# Patient Record
(demographics unavailable — no encounter records)

---

## 2024-11-07 NOTE — ADDENDUM
[FreeTextEntry1] : Luan Whelan assisted in documentation on Nov 7 2024 acting as a scribe for Dr. Rajan Toure.

## 2024-11-07 NOTE — DISCUSSION/SUMMARY
[FreeTextEntry1] : Patient is a 40-year-old white female with no cardiac risk factors postpartum 1 year no prior history of gestational diabetes or hypertension presents with atypical symptoms of midsternal chest pain not effort related mostly occurring at rest with position allergy and relieved with burping.  Patient unlikely has any underlying ischemic heart disease patient's pain is very atypical and not suggestive of cardiac disease baseline echocardiogram will be performed baseline EKG reveals sinus rhythm with no ST-T wave changes physical exam is otherwise normal reassurance was given to the patient [EKG obtained to assist in diagnosis and management of assessed problem(s)] : EKG obtained to assist in diagnosis and management of assessed problem(s)

## 2024-11-07 NOTE — REVIEW OF SYSTEMS
[Chest Discomfort] : chest discomfort [Negative] : Musculoskeletal [Fever] : no fever [Chills] : no chills [SOB] : no shortness of breath [Dyspnea on exertion] : not dyspnea during exertion [Palpitations] : no palpitations [Orthopnea] : no orthopnea

## 2024-11-07 NOTE — HISTORY OF PRESENT ILLNESS
[FreeTextEntry1] : 11/7/24   Balbina Gutierrez - Cardiology Consult - Atypical Chest Discomfort   Subjective: - Summary : The patient is a 40-year-old female with no chronic conditions. No current medication use reported. Has had 3 children, with no issues of blood pressure or diabetes during the pregnancies. Reports no family history of heart disease. The patient reports a new onset of intermittent chest discomfort over the past few weeks, exacerbated by certain positions and at rest. She further noted that the discomfort lasts for a few minutes. She also reports a low activity level. - Chief Complaint (CC) : Intermittent chest discomfort - History of Present Illness (HPI) : The patient has been experiencing intermittent chest discomfort for a few weeks. The discomfort is not constant, but comes and goes. The discomfort seems to have a positional component, with the patient feeling it more when lying down and sitting. The discomfort lasts for 2 minutes and resolves spontaneously. The patient does not report any associated symptoms, like difficulty in breathing or aggravation upon exertion. - Past Medical History : The patient denies any chronic medical condition. She mentioned 'Factor lighten' but denied requiring any medication for it. - Past Surgical History : The patient denies having any surgery, except for childbirth. - Family History : The patient denies any family history of heart disease. - Social History : The patient is a mother of three children. She leads a sedentary lifestyle with little to no exercise or walking. She does not report any history of smoking, alcohol, or recreational drug use. - Review of Systems : No history of recent cold, virus, cough, sneezing reported. - Medications : The patient is not currently on any medication. - Allergies : The patient denies having any known allergies to medication